# Patient Record
Sex: MALE | Race: WHITE | NOT HISPANIC OR LATINO | Employment: OTHER | URBAN - NONMETROPOLITAN AREA
[De-identification: names, ages, dates, MRNs, and addresses within clinical notes are randomized per-mention and may not be internally consistent; named-entity substitution may affect disease eponyms.]

---

## 2021-11-04 ENCOUNTER — HOSPITAL ENCOUNTER (INPATIENT)
Facility: HOSPITAL | Age: 41
LOS: 2 days | Discharge: LEFT BEFORE DISCHARGE | End: 2021-11-06
Attending: PSYCHIATRY & NEUROLOGY | Admitting: PSYCHIATRY & NEUROLOGY

## 2021-11-04 ENCOUNTER — HOSPITAL ENCOUNTER (EMERGENCY)
Facility: HOSPITAL | Age: 41
Discharge: HOME | End: 2021-11-04
Attending: EMERGENCY MEDICINE | Admitting: EMERGENCY MEDICINE

## 2021-11-04 ENCOUNTER — HOSPITAL ENCOUNTER (EMERGENCY)
Facility: HOSPITAL | Age: 41
Discharge: PSYCHIATRIC OR SUBSTANCE USE FACILITY | End: 2021-11-04
Attending: EMERGENCY MEDICINE | Admitting: EMERGENCY MEDICINE

## 2021-11-04 VITALS
WEIGHT: 163 LBS | DIASTOLIC BLOOD PRESSURE: 80 MMHG | OXYGEN SATURATION: 99 % | HEART RATE: 85 BPM | SYSTOLIC BLOOD PRESSURE: 125 MMHG | RESPIRATION RATE: 18 BRPM | BODY MASS INDEX: 22.08 KG/M2 | HEIGHT: 72 IN | TEMPERATURE: 97.8 F

## 2021-11-04 VITALS
WEIGHT: 182 LBS | DIASTOLIC BLOOD PRESSURE: 71 MMHG | BODY MASS INDEX: 24.65 KG/M2 | SYSTOLIC BLOOD PRESSURE: 123 MMHG | HEART RATE: 102 BPM | HEIGHT: 72 IN | OXYGEN SATURATION: 98 % | TEMPERATURE: 97.3 F | RESPIRATION RATE: 18 BRPM

## 2021-11-04 DIAGNOSIS — F19.10 SUBSTANCE ABUSE (HCC): Primary | ICD-10-CM

## 2021-11-04 DIAGNOSIS — R68.89 SYMPTOM OF DRUG WITHDRAWAL: Primary | ICD-10-CM

## 2021-11-04 PROBLEM — F19.20 CHEMICAL DEPENDENCY (HCC): Status: ACTIVE | Noted: 2021-11-04

## 2021-11-04 LAB
ALBUMIN SERPL-MCNC: 4.19 G/DL (ref 3.5–5.2)
ALBUMIN SERPL-MCNC: 4.23 G/DL (ref 3.5–5.2)
ALBUMIN/GLOB SERPL: 1.8 G/DL
ALBUMIN/GLOB SERPL: 1.8 G/DL
ALP SERPL-CCNC: 87 U/L (ref 39–117)
ALP SERPL-CCNC: 88 U/L (ref 39–117)
ALT SERPL W P-5'-P-CCNC: 15 U/L (ref 1–41)
ALT SERPL W P-5'-P-CCNC: 15 U/L (ref 1–41)
AMPHET+METHAMPHET UR QL: NEGATIVE
AMPHET+METHAMPHET UR QL: NEGATIVE
AMPHETAMINES UR QL: NEGATIVE
AMPHETAMINES UR QL: NEGATIVE
ANION GAP SERPL CALCULATED.3IONS-SCNC: 10.2 MMOL/L (ref 5–15)
ANION GAP SERPL CALCULATED.3IONS-SCNC: 10.5 MMOL/L (ref 5–15)
APAP SERPL-MCNC: <5 MCG/ML (ref 0–30)
AST SERPL-CCNC: 13 U/L (ref 1–40)
AST SERPL-CCNC: 14 U/L (ref 1–40)
BARBITURATES UR QL SCN: NEGATIVE
BARBITURATES UR QL SCN: NEGATIVE
BASOPHILS # BLD AUTO: 0.07 10*3/MM3 (ref 0–0.2)
BASOPHILS # BLD AUTO: 0.07 10*3/MM3 (ref 0–0.2)
BASOPHILS NFR BLD AUTO: 0.6 % (ref 0–1.5)
BASOPHILS NFR BLD AUTO: 0.6 % (ref 0–1.5)
BENZODIAZ UR QL SCN: NEGATIVE
BENZODIAZ UR QL SCN: POSITIVE
BILIRUB SERPL-MCNC: 0.3 MG/DL (ref 0–1.2)
BILIRUB SERPL-MCNC: 0.3 MG/DL (ref 0–1.2)
BILIRUB UR QL STRIP: NEGATIVE
BUN SERPL-MCNC: 10 MG/DL (ref 6–20)
BUN SERPL-MCNC: 11 MG/DL (ref 6–20)
BUN/CREAT SERPL: 11.4 (ref 7–25)
BUN/CREAT SERPL: 13.1 (ref 7–25)
BUPRENORPHINE SERPL-MCNC: NEGATIVE NG/ML
BUPRENORPHINE SERPL-MCNC: NEGATIVE NG/ML
CALCIUM SPEC-SCNC: 9.2 MG/DL (ref 8.6–10.5)
CALCIUM SPEC-SCNC: 9.3 MG/DL (ref 8.6–10.5)
CANNABINOIDS SERPL QL: POSITIVE
CANNABINOIDS SERPL QL: POSITIVE
CHLORIDE SERPL-SCNC: 107 MMOL/L (ref 98–107)
CHLORIDE SERPL-SCNC: 108 MMOL/L (ref 98–107)
CLARITY UR: ABNORMAL
CO2 SERPL-SCNC: 24.5 MMOL/L (ref 22–29)
CO2 SERPL-SCNC: 25.8 MMOL/L (ref 22–29)
COCAINE UR QL: NEGATIVE
COCAINE UR QL: NEGATIVE
COLOR UR: YELLOW
CREAT SERPL-MCNC: 0.84 MG/DL (ref 0.76–1.27)
CREAT SERPL-MCNC: 0.88 MG/DL (ref 0.76–1.27)
DEPRECATED RDW RBC AUTO: 46 FL (ref 37–54)
DEPRECATED RDW RBC AUTO: 48.6 FL (ref 37–54)
EOSINOPHIL # BLD AUTO: 0.36 10*3/MM3 (ref 0–0.4)
EOSINOPHIL # BLD AUTO: 0.39 10*3/MM3 (ref 0–0.4)
EOSINOPHIL NFR BLD AUTO: 3.1 % (ref 0.3–6.2)
EOSINOPHIL NFR BLD AUTO: 3.2 % (ref 0.3–6.2)
ERYTHROCYTE [DISTWIDTH] IN BLOOD BY AUTOMATED COUNT: 13.8 % (ref 12.3–15.4)
ERYTHROCYTE [DISTWIDTH] IN BLOOD BY AUTOMATED COUNT: 14.3 % (ref 12.3–15.4)
ETHANOL BLD-MCNC: <10 MG/DL (ref 0–10)
ETHANOL BLD-MCNC: <10 MG/DL (ref 0–10)
ETHANOL UR QL: <0.01 %
ETHANOL UR QL: <0.01 %
FLUAV RNA RESP QL NAA+PROBE: NOT DETECTED
FLUAV RNA RESP QL NAA+PROBE: NOT DETECTED
FLUBV RNA RESP QL NAA+PROBE: NOT DETECTED
FLUBV RNA RESP QL NAA+PROBE: NOT DETECTED
GFR SERPL CREATININE-BSD FRML MDRD: 102 ML/MIN/1.73
GFR SERPL CREATININE-BSD FRML MDRD: 96 ML/MIN/1.73
GLOBULIN UR ELPH-MCNC: 2.3 GM/DL
GLOBULIN UR ELPH-MCNC: 2.4 GM/DL
GLUCOSE SERPL-MCNC: 127 MG/DL (ref 65–99)
GLUCOSE SERPL-MCNC: 134 MG/DL (ref 65–99)
GLUCOSE UR STRIP-MCNC: NEGATIVE MG/DL
HCT VFR BLD AUTO: 40.9 % (ref 37.5–51)
HCT VFR BLD AUTO: 42.9 % (ref 37.5–51)
HGB BLD-MCNC: 13.5 G/DL (ref 13–17.7)
HGB BLD-MCNC: 13.9 G/DL (ref 13–17.7)
HGB UR QL STRIP.AUTO: NEGATIVE
IMM GRANULOCYTES # BLD AUTO: 0.04 10*3/MM3 (ref 0–0.05)
IMM GRANULOCYTES # BLD AUTO: 0.05 10*3/MM3 (ref 0–0.05)
IMM GRANULOCYTES NFR BLD AUTO: 0.3 % (ref 0–0.5)
IMM GRANULOCYTES NFR BLD AUTO: 0.4 % (ref 0–0.5)
KETONES UR QL STRIP: NEGATIVE
LEUKOCYTE ESTERASE UR QL STRIP.AUTO: NEGATIVE
LYMPHOCYTES # BLD AUTO: 2.86 10*3/MM3 (ref 0.7–3.1)
LYMPHOCYTES # BLD AUTO: 3.58 10*3/MM3 (ref 0.7–3.1)
LYMPHOCYTES NFR BLD AUTO: 24.3 % (ref 19.6–45.3)
LYMPHOCYTES NFR BLD AUTO: 29 % (ref 19.6–45.3)
MAGNESIUM SERPL-MCNC: 1.9 MG/DL (ref 1.6–2.6)
MCH RBC QN AUTO: 29.7 PG (ref 26.6–33)
MCH RBC QN AUTO: 29.9 PG (ref 26.6–33)
MCHC RBC AUTO-ENTMCNC: 32.4 G/DL (ref 31.5–35.7)
MCHC RBC AUTO-ENTMCNC: 33 G/DL (ref 31.5–35.7)
MCV RBC AUTO: 90.5 FL (ref 79–97)
MCV RBC AUTO: 91.7 FL (ref 79–97)
METHADONE UR QL SCN: NEGATIVE
METHADONE UR QL SCN: NEGATIVE
MONOCYTES # BLD AUTO: 0.72 10*3/MM3 (ref 0.1–0.9)
MONOCYTES # BLD AUTO: 0.8 10*3/MM3 (ref 0.1–0.9)
MONOCYTES NFR BLD AUTO: 6.1 % (ref 5–12)
MONOCYTES NFR BLD AUTO: 6.5 % (ref 5–12)
NEUTROPHILS NFR BLD AUTO: 60.3 % (ref 42.7–76)
NEUTROPHILS NFR BLD AUTO: 65.6 % (ref 42.7–76)
NEUTROPHILS NFR BLD AUTO: 7.44 10*3/MM3 (ref 1.7–7)
NEUTROPHILS NFR BLD AUTO: 7.74 10*3/MM3 (ref 1.7–7)
NITRITE UR QL STRIP: NEGATIVE
NRBC BLD AUTO-RTO: 0 /100 WBC (ref 0–0.2)
NRBC BLD AUTO-RTO: 0 /100 WBC (ref 0–0.2)
OPIATES UR QL: NEGATIVE
OPIATES UR QL: NEGATIVE
OXYCODONE UR QL SCN: NEGATIVE
OXYCODONE UR QL SCN: NEGATIVE
PCP UR QL SCN: NEGATIVE
PCP UR QL SCN: NEGATIVE
PH UR STRIP.AUTO: 7.5 [PH] (ref 5–8)
PLATELET # BLD AUTO: 291 10*3/MM3 (ref 140–450)
PLATELET # BLD AUTO: 318 10*3/MM3 (ref 140–450)
PMV BLD AUTO: 9.1 FL (ref 6–12)
PMV BLD AUTO: 9.2 FL (ref 6–12)
POTASSIUM SERPL-SCNC: 3.9 MMOL/L (ref 3.5–5.2)
POTASSIUM SERPL-SCNC: 4.3 MMOL/L (ref 3.5–5.2)
PROPOXYPH UR QL: NEGATIVE
PROPOXYPH UR QL: NEGATIVE
PROT SERPL-MCNC: 6.5 G/DL (ref 6–8.5)
PROT SERPL-MCNC: 6.6 G/DL (ref 6–8.5)
PROT UR QL STRIP: NEGATIVE
RBC # BLD AUTO: 4.52 10*6/MM3 (ref 4.14–5.8)
RBC # BLD AUTO: 4.68 10*6/MM3 (ref 4.14–5.8)
SALICYLATES SERPL-MCNC: <0.3 MG/DL
SARS-COV-2 RNA RESP QL NAA+PROBE: NOT DETECTED
SARS-COV-2 RNA RESP QL NAA+PROBE: NOT DETECTED
SODIUM SERPL-SCNC: 143 MMOL/L (ref 136–145)
SODIUM SERPL-SCNC: 143 MMOL/L (ref 136–145)
SP GR UR STRIP: 1.02 (ref 1–1.03)
TRICYCLICS UR QL SCN: POSITIVE
TRICYCLICS UR QL SCN: POSITIVE
UROBILINOGEN UR QL STRIP: ABNORMAL
WBC # BLD AUTO: 11.79 10*3/MM3 (ref 3.4–10.8)
WBC # BLD AUTO: 12.33 10*3/MM3 (ref 3.4–10.8)

## 2021-11-04 PROCEDURE — C9803 HOPD COVID-19 SPEC COLLECT: HCPCS

## 2021-11-04 PROCEDURE — 81003 URINALYSIS AUTO W/O SCOPE: CPT | Performed by: EMERGENCY MEDICINE

## 2021-11-04 PROCEDURE — 80306 DRUG TEST PRSMV INSTRMNT: CPT | Performed by: EMERGENCY MEDICINE

## 2021-11-04 PROCEDURE — 85025 COMPLETE CBC W/AUTO DIFF WBC: CPT | Performed by: EMERGENCY MEDICINE

## 2021-11-04 PROCEDURE — 80143 DRUG ASSAY ACETAMINOPHEN: CPT | Performed by: EMERGENCY MEDICINE

## 2021-11-04 PROCEDURE — 87636 SARSCOV2 & INF A&B AMP PRB: CPT | Performed by: EMERGENCY MEDICINE

## 2021-11-04 PROCEDURE — 93005 ELECTROCARDIOGRAM TRACING: CPT | Performed by: PSYCHIATRY & NEUROLOGY

## 2021-11-04 PROCEDURE — HZ2ZZZZ DETOXIFICATION SERVICES FOR SUBSTANCE ABUSE TREATMENT: ICD-10-PCS | Performed by: PSYCHIATRY & NEUROLOGY

## 2021-11-04 PROCEDURE — 82077 ASSAY SPEC XCP UR&BREATH IA: CPT | Performed by: EMERGENCY MEDICINE

## 2021-11-04 PROCEDURE — 99284 EMERGENCY DEPT VISIT MOD MDM: CPT

## 2021-11-04 PROCEDURE — 80179 DRUG ASSAY SALICYLATE: CPT | Performed by: EMERGENCY MEDICINE

## 2021-11-04 PROCEDURE — 83735 ASSAY OF MAGNESIUM: CPT | Performed by: EMERGENCY MEDICINE

## 2021-11-04 PROCEDURE — 63710000001 ONDANSETRON ODT 4 MG TABLET DISPERSIBLE: Performed by: EMERGENCY MEDICINE

## 2021-11-04 PROCEDURE — 80053 COMPREHEN METABOLIC PANEL: CPT | Performed by: EMERGENCY MEDICINE

## 2021-11-04 RX ORDER — LORAZEPAM 2 MG/1
2 TABLET ORAL EVERY 4 HOURS PRN
Status: DISCONTINUED | OUTPATIENT
Start: 2021-11-05 | End: 2021-11-05

## 2021-11-04 RX ORDER — CLONIDINE HYDROCHLORIDE 0.1 MG/1
0.1 TABLET ORAL 4 TIMES DAILY PRN
Status: DISPENSED | OUTPATIENT
Start: 2021-11-05 | End: 2021-11-06

## 2021-11-04 RX ORDER — FAMOTIDINE 20 MG/1
20 TABLET, FILM COATED ORAL 2 TIMES DAILY PRN
Status: DISCONTINUED | OUTPATIENT
Start: 2021-11-04 | End: 2021-11-06 | Stop reason: HOSPADM

## 2021-11-04 RX ORDER — BENZONATATE 100 MG/1
100 CAPSULE ORAL 3 TIMES DAILY PRN
Status: DISCONTINUED | OUTPATIENT
Start: 2021-11-04 | End: 2021-11-06 | Stop reason: HOSPADM

## 2021-11-04 RX ORDER — HYDROXYZINE 50 MG/1
50 TABLET, FILM COATED ORAL EVERY 6 HOURS PRN
Status: DISCONTINUED | OUTPATIENT
Start: 2021-11-04 | End: 2021-11-06 | Stop reason: HOSPADM

## 2021-11-04 RX ORDER — NICOTINE 21 MG/24HR
1 PATCH, TRANSDERMAL 24 HOURS TRANSDERMAL
Status: DISCONTINUED | OUTPATIENT
Start: 2021-11-05 | End: 2021-11-06 | Stop reason: HOSPADM

## 2021-11-04 RX ORDER — ONDANSETRON 4 MG/1
4 TABLET, FILM COATED ORAL EVERY 6 HOURS PRN
Status: DISCONTINUED | OUTPATIENT
Start: 2021-11-04 | End: 2021-11-06 | Stop reason: HOSPADM

## 2021-11-04 RX ORDER — ONDANSETRON 4 MG/1
4 TABLET, ORALLY DISINTEGRATING ORAL EVERY 6 HOURS PRN
Qty: 20 TABLET | Refills: 0 | Status: SHIPPED | OUTPATIENT
Start: 2021-11-04 | End: 2021-11-04

## 2021-11-04 RX ORDER — ONDANSETRON 4 MG/1
4 TABLET, ORALLY DISINTEGRATING ORAL ONCE
Status: COMPLETED | OUTPATIENT
Start: 2021-11-04 | End: 2021-11-04

## 2021-11-04 RX ORDER — LORAZEPAM 0.5 MG/1
0.5 TABLET ORAL
Status: DISCONTINUED | OUTPATIENT
Start: 2021-11-08 | End: 2021-11-05

## 2021-11-04 RX ORDER — LORAZEPAM 2 MG/1
2 TABLET ORAL
Status: DISCONTINUED | OUTPATIENT
Start: 2021-11-05 | End: 2021-11-05

## 2021-11-04 RX ORDER — CYCLOBENZAPRINE HCL 10 MG
10 TABLET ORAL 3 TIMES DAILY PRN
Status: DISCONTINUED | OUTPATIENT
Start: 2021-11-04 | End: 2021-11-06 | Stop reason: HOSPADM

## 2021-11-04 RX ORDER — CLONIDINE HYDROCHLORIDE 0.1 MG/1
0.1 TABLET ORAL 4 TIMES DAILY PRN
Status: DISPENSED | OUTPATIENT
Start: 2021-11-04 | End: 2021-11-05

## 2021-11-04 RX ORDER — BENZTROPINE MESYLATE 1 MG/ML
1 INJECTION INTRAMUSCULAR; INTRAVENOUS ONCE AS NEEDED
Status: DISCONTINUED | OUTPATIENT
Start: 2021-11-04 | End: 2021-11-06 | Stop reason: HOSPADM

## 2021-11-04 RX ORDER — IBUPROFEN 400 MG/1
400 TABLET ORAL EVERY 6 HOURS PRN
Status: DISCONTINUED | OUTPATIENT
Start: 2021-11-04 | End: 2021-11-06 | Stop reason: HOSPADM

## 2021-11-04 RX ORDER — TRAZODONE HYDROCHLORIDE 50 MG/1
50 TABLET ORAL NIGHTLY PRN
Status: DISCONTINUED | OUTPATIENT
Start: 2021-11-04 | End: 2021-11-06 | Stop reason: HOSPADM

## 2021-11-04 RX ORDER — ALUMINA, MAGNESIA, AND SIMETHICONE 2400; 2400; 240 MG/30ML; MG/30ML; MG/30ML
15 SUSPENSION ORAL EVERY 6 HOURS PRN
Status: DISCONTINUED | OUTPATIENT
Start: 2021-11-04 | End: 2021-11-06 | Stop reason: HOSPADM

## 2021-11-04 RX ORDER — LORAZEPAM 1 MG/1
1 TABLET ORAL
Status: DISCONTINUED | OUTPATIENT
Start: 2021-11-07 | End: 2021-11-05

## 2021-11-04 RX ORDER — CLONIDINE HYDROCHLORIDE 0.1 MG/1
0.1 TABLET ORAL 2 TIMES DAILY PRN
Status: DISCONTINUED | OUTPATIENT
Start: 2021-11-07 | End: 2021-11-06 | Stop reason: HOSPADM

## 2021-11-04 RX ORDER — LORAZEPAM 1 MG/1
1 TABLET ORAL EVERY 4 HOURS PRN
Status: DISCONTINUED | OUTPATIENT
Start: 2021-11-07 | End: 2021-11-05

## 2021-11-04 RX ORDER — CLONIDINE HYDROCHLORIDE 0.1 MG/1
0.1 TABLET ORAL 3 TIMES DAILY PRN
Status: DISCONTINUED | OUTPATIENT
Start: 2021-11-06 | End: 2021-11-06 | Stop reason: HOSPADM

## 2021-11-04 RX ORDER — CLONIDINE HYDROCHLORIDE 0.1 MG/1
0.1 TABLET ORAL DAILY PRN
Status: DISCONTINUED | OUTPATIENT
Start: 2021-11-08 | End: 2021-11-06 | Stop reason: HOSPADM

## 2021-11-04 RX ORDER — LORAZEPAM 1 MG/1
1 TABLET ORAL ONCE
Status: COMPLETED | OUTPATIENT
Start: 2021-11-04 | End: 2021-11-04

## 2021-11-04 RX ORDER — BENZTROPINE MESYLATE 1 MG/1
2 TABLET ORAL ONCE AS NEEDED
Status: DISCONTINUED | OUTPATIENT
Start: 2021-11-04 | End: 2021-11-06 | Stop reason: HOSPADM

## 2021-11-04 RX ORDER — DICYCLOMINE HYDROCHLORIDE 10 MG/1
10 CAPSULE ORAL 3 TIMES DAILY PRN
Status: DISCONTINUED | OUTPATIENT
Start: 2021-11-04 | End: 2021-11-06 | Stop reason: HOSPADM

## 2021-11-04 RX ORDER — ECHINACEA PURPUREA EXTRACT 125 MG
2 TABLET ORAL AS NEEDED
Status: DISCONTINUED | OUTPATIENT
Start: 2021-11-04 | End: 2021-11-06 | Stop reason: HOSPADM

## 2021-11-04 RX ORDER — LORAZEPAM 2 MG/1
2 TABLET ORAL
Status: DISCONTINUED | OUTPATIENT
Start: 2021-11-04 | End: 2021-11-05

## 2021-11-04 RX ORDER — LOPERAMIDE HYDROCHLORIDE 2 MG/1
2 CAPSULE ORAL
Status: DISCONTINUED | OUTPATIENT
Start: 2021-11-04 | End: 2021-11-06 | Stop reason: HOSPADM

## 2021-11-04 RX ORDER — ACETAMINOPHEN 325 MG/1
650 TABLET ORAL EVERY 6 HOURS PRN
Status: DISCONTINUED | OUTPATIENT
Start: 2021-11-04 | End: 2021-11-06 | Stop reason: HOSPADM

## 2021-11-04 RX ORDER — LORAZEPAM 0.5 MG/1
0.5 TABLET ORAL EVERY 4 HOURS PRN
Status: DISCONTINUED | OUTPATIENT
Start: 2021-11-08 | End: 2021-11-05

## 2021-11-04 RX ADMIN — ONDANSETRON 4 MG: 4 TABLET, ORALLY DISINTEGRATING ORAL at 02:47

## 2021-11-04 RX ADMIN — LORAZEPAM 1 MG: 1 TABLET ORAL at 02:47

## 2021-11-04 NOTE — NURSING NOTE
Pt declining assessment, states he was at [facility] and called his mom to come get him to bring him here for detox. Pt now states he wishes to go back to [facility] so he doesn't mess up his recovery. Pt does not want any controlled substances to help with his withdrawals. No SI/HI/AVH reported.

## 2021-11-04 NOTE — ED PROVIDER NOTES
Subjective   41-year-old male presents to the ER with complaints of withdrawal symptoms. Patient states that he is used Suboxone and methamphetamines and opiates over the last 20 years. Patient is currently a resident at a local rehab facility when he started experiencing the symptoms. Patient states he has had nausea and vomiting. Patient verbalizes last use of recreational Suboxone was about 1 week prior to arrival. Patient denies any SI or HI.      History provided by:  Patient      Review of Systems   Constitutional: Negative.  Negative for fever.   HENT: Negative.    Respiratory: Negative.    Cardiovascular: Negative.  Negative for chest pain.   Gastrointestinal: Positive for nausea and vomiting. Negative for abdominal pain.   Endocrine: Negative.    Genitourinary: Negative.  Negative for dysuria.   Skin: Negative.    Neurological: Negative.    Psychiatric/Behavioral: Negative.    All other systems reviewed and are negative.      No past medical history on file.    No Known Allergies    No past surgical history on file.    No family history on file.    Social History     Socioeconomic History   • Marital status:            Objective   Physical Exam  Vitals and nursing note reviewed.   Constitutional:       General: He is not in acute distress.     Appearance: He is well-developed. He is not diaphoretic.   HENT:      Head: Normocephalic and atraumatic.      Right Ear: External ear normal.      Left Ear: External ear normal.      Nose: Nose normal.   Eyes:      Conjunctiva/sclera: Conjunctivae normal.   Neck:      Vascular: No JVD.      Trachea: No tracheal deviation.   Cardiovascular:      Rate and Rhythm: Normal rate.      Heart sounds: No murmur heard.      Pulmonary:      Effort: Pulmonary effort is normal. No respiratory distress.      Breath sounds: No wheezing.   Abdominal:      Palpations: Abdomen is soft.      Tenderness: There is no abdominal tenderness.   Musculoskeletal:         General: No  deformity. Normal range of motion.      Cervical back: Normal range of motion and neck supple.   Skin:     General: Skin is warm and dry.      Coloration: Skin is not pale.      Findings: No erythema or rash.   Neurological:      Mental Status: He is alert and oriented to person, place, and time.      Cranial Nerves: No cranial nerve deficit.   Psychiatric:         Behavior: Behavior normal.         Thought Content: Thought content normal.         Procedures           ED Course  ED Course as of 11/04/21 0439   Thu Nov 04, 2021 0438 Patient verbalized that he is feeling better. Patient wishes to go back to rehab facility. Patient was cleared by behavioral health to be discharged. [RB]      ED Course User Index  [RB] Agustin Kidd II, PA                                           MDM  Number of Diagnoses or Management Options  Symptom of drug withdrawal: new and requires workup     Amount and/or Complexity of Data Reviewed  Clinical lab tests: ordered and reviewed  Discuss the patient with other providers: yes    Risk of Complications, Morbidity, and/or Mortality  Presenting problems: low  Diagnostic procedures: low  Management options: low    Patient Progress  Patient progress: stable      Final diagnoses:   Symptom of drug withdrawal       ED Disposition  ED Disposition     ED Disposition Condition Comment    Discharge Stable           Marie Ocampo, APRN  [address] [address] [address]  [phone]    Schedule an appointment as soon as possible for a visit            Medication List      New Prescriptions    ondansetron ODT 4 MG disintegrating tablet  Commonly known as: ZOFRAN-ODT  Place 1 tablet on the tongue Every 6 (Six) Hours As Needed for Nausea or Vomiting.           Where to Get Your Medications      You can get these medications from any pharmacy    Bring a paper prescription for each of these medications  · ondansetron ODT 4 MG disintegrating tablet          Agustin Kidd II, PA  11/04/21 0439

## 2021-11-04 NOTE — NURSING NOTE
Pt to intake. Search completed with 2 staff members present. Pt educated about mask and encouraged to keep mask on in intake area. Items placed in cabinet.

## 2021-11-04 NOTE — NURSING NOTE
Spoke with Dr. Ruiz presenting pt clinicals.  MD advised pt does not wish to be admitted. Careful discussion with patient about discharge. No objective or reported signs of SI, acute distress, or self harm. Safety plan discussed, patient contracted. Offered inpatient services if felt needed. Patient declined. Crisis number provided. Advised if change of condition or worsening of symptoms return to ed and/or seek outpt services.

## 2021-11-05 PROBLEM — F15.20 METHAMPHETAMINE USE DISORDER, SEVERE (HCC): Status: ACTIVE | Noted: 2021-11-05

## 2021-11-05 PROBLEM — F17.200 NICOTINE USE DISORDER: Status: ACTIVE | Noted: 2021-11-05

## 2021-11-05 PROBLEM — F12.20 TETRAHYDROCANNABINOL (THC) USE DISORDER, SEVERE, DEPENDENCE (HCC): Status: ACTIVE | Noted: 2021-11-05

## 2021-11-05 PROBLEM — F11.20 OPIOID USE DISORDER, SEVERE, DEPENDENCE (HCC): Status: ACTIVE | Noted: 2021-11-04

## 2021-11-05 PROBLEM — F32.A DEPRESSION: Status: ACTIVE | Noted: 2021-11-05

## 2021-11-05 LAB
QT INTERVAL: 388 MS
QTC INTERVAL: 406 MS

## 2021-11-05 PROCEDURE — 93010 ELECTROCARDIOGRAM REPORT: CPT | Performed by: INTERNAL MEDICINE

## 2021-11-05 PROCEDURE — 99223 1ST HOSP IP/OBS HIGH 75: CPT | Performed by: PSYCHIATRY & NEUROLOGY

## 2021-11-05 RX ORDER — CHLORDIAZEPOXIDE HYDROCHLORIDE 25 MG/1
25 CAPSULE, GELATIN COATED ORAL ONCE
Status: DISCONTINUED | OUTPATIENT
Start: 2021-11-05 | End: 2021-11-05

## 2021-11-05 RX ORDER — FLUOXETINE HYDROCHLORIDE 20 MG/1
20 CAPSULE ORAL DAILY
Status: DISCONTINUED | OUTPATIENT
Start: 2021-11-05 | End: 2021-11-06 | Stop reason: HOSPADM

## 2021-11-05 RX ADMIN — HYDROXYZINE HYDROCHLORIDE 50 MG: 50 TABLET ORAL at 21:56

## 2021-11-05 RX ADMIN — CLONIDINE HYDROCHLORIDE 0.1 MG: 0.1 TABLET ORAL at 22:20

## 2021-11-05 RX ADMIN — TRAZODONE HYDROCHLORIDE 50 MG: 50 TABLET ORAL at 21:21

## 2021-11-05 RX ADMIN — CLONIDINE HYDROCHLORIDE 0.1 MG: 0.1 TABLET ORAL at 21:56

## 2021-11-05 RX ADMIN — CLONIDINE HYDROCHLORIDE 0.1 MG: 0.1 TABLET ORAL at 14:22

## 2021-11-05 RX ADMIN — LORAZEPAM 2 MG: 2 TABLET ORAL at 08:39

## 2021-11-05 RX ADMIN — FLUOXETINE HYDROCHLORIDE 20 MG: 20 CAPSULE ORAL at 14:21

## 2021-11-05 RX ADMIN — IBUPROFEN 400 MG: 400 TABLET, FILM COATED ORAL at 08:40

## 2021-11-05 RX ADMIN — CYCLOBENZAPRINE 10 MG: 10 TABLET, FILM COATED ORAL at 21:21

## 2021-11-05 NOTE — H&P
INITIAL PSYCHIATRIC HISTORY & PHYSICAL    Patient Identification:  Name:  Kvng Adler  Age:  41 y.o.  Sex:  male  :  1980  MRN:  [id number]   Visit Number:  [id number]  Primary Care Physician:  Marie Ocampo APRN    SUBJECTIVE    CC/Focus of Exam: withdrawals from Suboxone    HPI: Kvng Adler is a 41 y.o. male who was admitted on 2021 with complaints of opioid use and withdrawals. The patient reports a long history of substance use. First use was at age 20 when he used Xanax and Lorcet recreationally. Over time the use increased and the patient  continued to use despite negative consequences. The patient endorses symptoms of tolerance and withdrawals. Has tried to cut down and stop but has not been successful. Spends too much time and resources in pursuit of substance use. Longest period of sobriety is reported to be 3 months.  Currently using Suboxone 8-2 mg one a day intranasally last use was a week ago, meth half gram daily via smoking last use two weeks ago, marijuana a quarter of ounce daily last use a week ago, oxycodone up to a 100 mg daily intranasally last use three weeks. He states he was at the recovery house and was put in treatment under [place] and plans to go back to treatment. He denies regular benzo use, stopped using regularly when he was 21 or 22. He states he used a Xanax one time yesterday, and was also given Ativan one time when he was in the ED.   Withdrawal symptoms include twitching, chills, body aches.     PAST PSYCHIATRIC HX: Patient reports he was recently started on Prozac for depression and anxiety by his pcp.     SUBSTANCE USE HX: See HPI. Smokes cigarettes about one ppd.     SOCIAL HX:   Social History     Socioeconomic History   • Marital status:    Tobacco Use   • Smoking status: Current Every Day Smoker     Packs/day: 1.00     Types: Cigarettes   • Smokeless tobacco: Never Used   Vaping Use   • Vaping Use: Never used   Substance and Sexual  Activity   • Alcohol use: Not Currently   • Drug use: Yes     Types: Benzodiazepines, Other, Oxycodone, Methamphetamines, Marijuana     Comment: Suboxone   • Sexual activity: Yes     Partners: Female         Past Medical History:   Diagnosis Date   • Substance abuse (HCC)         History reviewed. No pertinent surgical history.    Family History   Problem Relation Age of Onset   • Bipolar disorder Mother          No medications prior to admission.         ALLERGIES:  Patient has no known allergies.    Temp:  [97.5 °F (36.4 °C)-98.6 °F (37 °C)] 97.8 °F (36.6 °C)  Heart Rate:  [] 85  Resp:  [10-18] 18  BP: (116-152)/(80-97) 152/86    REVIEW OF SYSTEMS:  Review of Systems   Constitutional: Positive for chills.   HENT: Negative.    Eyes: Negative.    Respiratory: Negative.    Cardiovascular: Negative.    Gastrointestinal: Negative.    Endocrine: Negative.    Genitourinary: Negative.    Musculoskeletal: Negative.    Skin: Negative.    Allergic/Immunologic: Negative.    Neurological: Negative.    Hematological: Negative.    Psychiatric/Behavioral: The patient is nervous/anxious.         OBJECTIVE    PHYSICAL EXAM:  Physical Exam  Constitutional:  Appears well-developed and well-nourished.   HENT:   Head: Normocephalic and atraumatic.   Right Ear: External ear normal.   Left Ear: External ear normal.   Mouth/Throat: Oropharynx is clear and moist.   Eyes: Pupils are equal, round, and reactive to light. Conjunctivae and EOM are normal.   Neck: Normal range of motion. Neck supple.   Cardiovascular: Normal rate, regular rhythm and normal heart sounds.    Respiratory: Effort normal and breath sounds normal. No respiratory distress. No wheezes.   GI: Soft. Bowel sounds are normal.No distension. There is no tenderness.   Musculoskeletal: Normal range of motion. No edema or deformity.   Neurological:No cranial nerve deficit. Coordination normal.   Skin: Skin is warm and dry. No rash noted. No erythema.       MENTAL STATUS  EXAM:   Hygiene:   fair  Cooperation:  Cooperative  Eye Contact:  Fair  Psychomotor Behavior:  Appropriate  Affect:  Appropriate  Hopelessness: Denies  Speech:  Normal  Goal directed  Thought Content:  Normal  Suicidal:  None  Homicidal:  None  Hallucinations:  None  Delusion:  None  Memory:  Intact  Orientation:  Person, Place, Time and Situation  Reliability:  fair  Insight:  Fair  Judgement:  Fair      Imaging Results (Last 24 Hours)     ** No results found for the last 24 hours. **           ECG/EMG Results (most recent)     Procedure Component Value Units Date/Time    ECG 12 Lead [[id number]] Collected: 11/05/21 0044     Updated: 11/05/21 1240     QT Interval 388 ms      QTC Interval 406 ms     Narrative:      Test Reason : Baseline Cardiac Status  Blood Pressure :   */*   mmHG  Vent. Rate :  66 BPM     Atrial Rate :  66 BPM     P-R Int : 140 ms          QRS Dur :  94 ms      QT Int : 388 ms       P-R-T Axes :  57  31  61 degrees     QTc Int : 406 ms    Normal sinus rhythm with sinus arrhythmia  Possible Anterior infarct , age undetermined  Abnormal ECG  No previous ECGs available  Confirmed by Bryant Parsons ([id number]) on 11/5/2021 12:37:41 PM    Referred By:            Confirmed By: Bryant Parsons           Lab Results   Component Value Date    GLUCOSE 127 (H) 11/04/2021    BUN 10 11/04/2021    CREATININE 0.88 11/04/2021    EGFRIFNONA 96 11/04/2021    BCR 11.4 11/04/2021    CO2 24.5 11/04/2021    CALCIUM 9.3 11/04/2021    ALBUMIN 4.19 11/04/2021    AST 14 11/04/2021    ALT 15 11/04/2021       Lab Results   Component Value Date    WBC 11.79 (H) 11/04/2021    HGB 13.9 11/04/2021    HCT 42.9 11/04/2021    MCV 91.7 11/04/2021     11/04/2021       Last Urine Toxicity     LAST URINE TOXICITY RESULTS Latest Ref Rng & Units 11/4/2021 11/4/2021    AMPHETAMINES SCREEN, URINE Negative Negative Negative    BARBITURATES SCREEN Negative Negative Negative    BENZODIAZEPINE SCREEN, URINE Negative Positive(A) Negative     BUPRENORPHINEUR Negative Negative Negative    COCAINE SCREEN, URINE Negative Negative Negative    METHADONE SCREEN, URINE Negative Negative Negative    METHAMPHETAMINEUR Negative Negative Negative          Brief Urine Lab Results  (Last result in the past 365 days)      Color   Clarity   Blood   Leuk Est   Nitrite   Protein   CREAT   Urine HCG        11/04/21 1913 Yellow   Turbid   Negative   Negative   Negative   Negative                 DATA  Labs reviewed. Glucose 127, Chloride 108, WBC 11.79. UDS positive for benzodiazepine, thc and tricyclic antidepressant  EKG reviewed. QTc 406 ms. DANGELO reviewed. No active controlled rx noted.   Record reviewed. No previous treatments noted in this hospital.     Strengths: Motivated for treatment    Weaknesses:Substance use and Poor coping skills    Code status:  Full  Discussed code status with patient.    ASSESSMENT & PLAN:        Opioid use disorder, severe, dependence (HCC)  - Clonidine detox  - The patient may be experiencing delayed withdrawals from buprenorphine, will treat supportively and avoid giving more buprenorphine as he has not used any in more than a week.       Benzo use  - Patient denies regular use and only took one Xanax and was given Ativan in the ED. Doesn't appear to be in need of Ativan detox and it will be discontinued.      Methamphetamine use disorder, severe (HCC)  - Supportive treatment      Tetrahydrocannabinol (THC) use disorder, severe, dependence (HCC)  - Supportive treatment      Nicotine use disorder  - Encourage cessation      Depressive disorder unspecified  - Continue Prozac.      The patient has been admitted for safety and stabilization.  Patient will be monitored for suicidality daily and maintained on Special Precautions Level 4 (q30 min checks).  The patient will have individual and group therapy with a master's level therapist. A master treatment plan will be developed and agreed upon by the patient and his/her treatment team.   The patient's estimated length of stay in the hospital is 5-7 days.

## 2021-11-05 NOTE — PLAN OF CARE
Problem: Adult Behavioral Health Plan of Care  Goal: Plan of Care Review  Outcome: Ongoing, Progressing  Flowsheets (Taken 11/5/2021 1615)  Progress: no change  Plan of Care Reviewed With: patient  Outcome Summary: pt agrees to stay one more night.pt calm and cooperative.   Goal Outcome Evaluation:  Plan of Care Reviewed With: patient        Progress: no change  Outcome Summary: pt agrees to stay one more night.pt calm and cooperative.

## 2021-11-05 NOTE — ED PROVIDER NOTES
Subjective   41-year-old male presents to the ED today for detox evaluation. He states he needs detox from Suboxone. He states his last use was about a week ago. He states he is having tremors and shakes. He denies any alcohol use. He states he also uses methamphetamine, marijuana and oxycodone. He states that it has been over a week since he used these. He states he did have Ativan last night and a Xanax today to help with his withdrawal symptoms. He does not regularly use benzodiazepines. He denies any suicidal ideations.      History provided by:  Patient  Drug / Alcohol Assessment  Primary symptoms comment: requesting detox. This is a new problem. The current episode started more than 2 days ago. The problem has been gradually worsening. Suspected agents include opiates. Pertinent negatives include no nausea and no vomiting. Associated medical issues include addiction treatment.       Review of Systems   Constitutional: Negative.    HENT: Negative.    Eyes: Negative.    Respiratory: Negative.    Cardiovascular: Negative.    Gastrointestinal: Negative for nausea and vomiting.   Genitourinary: Negative.    Musculoskeletal: Negative.    Skin: Negative.    Neurological: Positive for tremors.   Psychiatric/Behavioral: Negative for suicidal ideas. The patient is nervous/anxious.    All other systems reviewed and are negative.      Past Medical History:   Diagnosis Date   • Substance abuse (HCC)        No Known Allergies    History reviewed. No pertinent surgical history.    Family History   Problem Relation Age of Onset   • Bipolar disorder Mother        Social History     Socioeconomic History   • Marital status:    Tobacco Use   • Smoking status: Current Every Day Smoker     Packs/day: 1.00     Types: Cigarettes   • Smokeless tobacco: Never Used   Vaping Use   • Vaping Use: Never used   Substance and Sexual Activity   • Alcohol use: Not Currently   • Drug use: Yes     Types: Benzodiazepines, Other, Oxycodone,  Methamphetamines, Marijuana     Comment: Suboxone   • Sexual activity: Yes     Partners: Female           Objective   Physical Exam  Vitals and nursing note reviewed.   Constitutional:       General: He is not in acute distress.     Appearance: Normal appearance.   HENT:      Head: Normocephalic and atraumatic.      Right Ear: External ear normal.      Left Ear: External ear normal.   Eyes:      Conjunctiva/sclera: Conjunctivae normal.      Pupils: Pupils are equal, round, and reactive to light.   Cardiovascular:      Rate and Rhythm: Regular rhythm. Tachycardia present.      Pulses: Normal pulses.      Heart sounds: Normal heart sounds.   Pulmonary:      Effort: Pulmonary effort is normal.      Breath sounds: Normal breath sounds.   Abdominal:      General: Bowel sounds are normal.      Palpations: Abdomen is soft.   Musculoskeletal:         General: Normal range of motion.      Cervical back: Normal range of motion and neck supple.   Skin:     General: Skin is warm and dry.      Capillary Refill: Capillary refill takes less than 2 seconds.   Neurological:      General: No focal deficit present.      Mental Status: He is alert and oriented to person, place, and time.   Psychiatric:         Mood and Affect: Mood is anxious.         Speech: Speech normal.         Behavior: Behavior normal. Behavior is cooperative.         Thought Content: Thought content does not include homicidal or suicidal ideation.         Procedures           ED Course  ED Course as of 11/04/21 2204   u Nov 04, 2021 2001 Medically clear for detox []   2015 Endorsed to Dr. Miller []      ED Course User Index  [] Wendy Rosenbaum, PA                                           [facility]  Number of Diagnoses or Management Options  Substance abuse (HCC): established and worsening     Amount and/or Complexity of Data Reviewed  Clinical lab tests: reviewed and ordered  Tests in the radiology section of CPT®: ordered and reviewed  Tests in the  medicine section of CPT®: ordered and reviewed  Review and summarize past medical records: yes  Discuss the patient with other providers: yes  Independent visualization of images, tracings, or specimens: yes    Risk of Complications, Morbidity, and/or Mortality  Presenting problems: moderate  Diagnostic procedures: moderate  Management options: moderate    Patient Progress  Patient progress: stable      Final diagnoses:   Substance abuse (HCC)       ED Disposition  ED Disposition     ED Disposition Condition Comment    Discharge to Behavioral Health Stable           No follow-up provider specified.       Medication List      No changes were made to your prescriptions during this visit.          Wendy Rosenbaum PA  11/04/21 2016       Cyril Miller MD  11/04/21 2206

## 2021-11-05 NOTE — NURSING NOTE
Intake assessment completed at this time. Pt presents to Intake wishing for detox. Pt was here at 2am, but did not want help detoxing and wished to go back to his rehab. Pt went home, spoke with his parents and wife, and agreed he needs help with detox. Pt states he took a Xanxax before coming to the ED to control his withdrawal symptoms.    Labs  WBC 11.79  UDS positive for benzos, THC, tricyclic antidepressants.    Anxiety 10 depression 5 craving 0 on scale of 0-10. Sleep poor appetite comes and goes.  Pt denies SI/HI/AVH.    COWS 9  CIWA 7    Pt A&Ox 3

## 2021-11-05 NOTE — NURSING NOTE
[facility] R[facility] contacted at this time for chart review and request of bed assignment, Bed assigned to 30B.

## 2021-11-05 NOTE — PLAN OF CARE
Goal Outcome Evaluation:  Plan of Care Reviewed With: patient  Patient Agreement with Plan of Care: agrees  Consent Given to Review Plan with: [facility]  Progress: no change  Outcome Summary: Completed social history. Reviewed treatment plans. Patient agreeable.      Problem: Adult Behavioral Health Plan of Care  Goal: Plan of Care Review  Outcome: Ongoing, Progressing  Flowsheets (Taken 11/5/2021 1459)  Consent Given to Review Plan with: [facility]  Progress: no change  Plan of Care Reviewed With: patient  Patient Agreement with Plan of Care: agrees  Outcome Summary: Completed social history. Reviewed treatment plans. Patient agreeable.     Problem: Adult Behavioral Health Plan of Care  Goal: Patient-Specific Goal (Individualization)  Outcome: Ongoing, Progressing  Flowsheets (Taken 11/5/2021 1459)  Patient Personal Strengths:   resilient   resourceful   family/social support   stable living environment   socioeconomic stability   relationship stability   positive attitude   positive vocational history  Patient-Specific Goals (Include Timeframe): Identify 1-2 cognitive distortions  Individualized Care Needs: CBT  Anxieties, Fears or Concerns: Concerned about returning to work  Patient Vulnerabilities:   substance abuse/addiction   poor impulse control   lacks insight into illness   family/relationship conflict     Problem: Adult Behavioral Health Plan of Care  Goal: Optimized Coping Skills in Response to Life Stressors  Intervention: Promote Effective Coping Strategies  Flowsheets (Taken 11/5/2021 1459)  Supportive Measures:   active listening utilized   positive reinforcement provided   self-responsibility promoted   verbalization of feelings encouraged   problem-solving facilitated   counseling provided   self-reflection promoted     Problem: Adult Behavioral Health Plan of Care  Goal: Develops/Participates in Therapeutic [place] to Support Successful Transition  Intervention: Foster Therapeutic  [place]  Flowsheets (Taken 11/5/2021 0978)  Trust Relationship/Rapport:   care explained   reassurance provided   thoughts/feelings acknowledged   choices provided   emotional support provided   empathic listening provided   questions answered   questions encouraged     Problem: Adult Behavioral Health Plan of Care  Goal: Develops/Participates in Therapeutic [place] to Support Successful Transition  Intervention: Mutually Develop Transition Plan  Flowsheets (Taken 11/5/2021 6537)  Outpatient/Agency/Support Group Needs:   12 step program (specify)   residential services   support group(s) (specify)  Discharge Coordination/Progress: [facility]  Transition Support: follow-up care discussed  Transportation Anticipated: family or friend will provide  Anticipated Discharge Disposition: residential substance use unit  Transportation Concerns: car, none  Current Discharge Risk: substance use/abuse  Concerns to be Addressed:   mental health   discharge planning   substance/tobacco abuse/use   coping/stress  Readmission Within the Last 30 Days: no previous admission in last 30 days  Patient/Family Anticipated Services at Transition: rehabilitation services  Patient's Choice of Community Agency(s): [facility]  Patient/Family Anticipates Transition to: inpatient rehabilitation facility  Offered/Gave Vendor List: no       [id number]  D: Patient is a 41-year-old  male currently residing in [facility] where he has been living with his spouse for the past 6 years.  They have been together for 21 years.  The patient has some college education.  He is currently self-employed building insulated out buildings.  The patient identified himself as an addict, reporting his drugs of choice were opioids and marijuana.  He has no history of treatment.  His wife petitioned him with [place]'s law.  The patient planned to [facility] the program at [facility] in [place] directly upon discharge.  However, the nursing staff  report the patient's spouse has been calling frequently and has been requesting he be discharged from the hospital.    A: Patient's affect appeared euthymic.  His mood appeared calm.  He was polite and cooperative with the assessment.  The patient's speech seemed circumstantial at times, and his responses to the therapist were occasionally unrelated to the question being asked.  The patient seemed to have poor insight.  His motivation also seemed poor.  It appeared he was under the impression that he could go home every day while at a residential treatment program.    P: Patient has been placed on a detox regimen.  He will be monitored routinely for his safety.  He will be provided with individual and group therapy.  He will follow-up [facility]  However, his family had informed the nursing staff today that they were on their way to pick him up.  Apparently this was related to the patient having discontinued his Ativan detox.

## 2021-11-05 NOTE — NURSING NOTE
Spoke to Dr. Ruiz via phone. Intake information provided. Instructed to admit the patient. Admit orders received. SP4 routine orders with clonidine and Ativan detox protocol [id number]. Patient and ed provider made aware of plan of care. Safety precautions maintained.

## 2021-11-06 VITALS
HEIGHT: 72 IN | TEMPERATURE: 98.5 F | SYSTOLIC BLOOD PRESSURE: 134 MMHG | HEART RATE: 83 BPM | OXYGEN SATURATION: 98 % | BODY MASS INDEX: 23.7 KG/M2 | RESPIRATION RATE: 18 BRPM | DIASTOLIC BLOOD PRESSURE: 87 MMHG | WEIGHT: 175 LBS

## 2021-11-06 PROCEDURE — 99232 SBSQ HOSP IP/OBS MODERATE 35: CPT | Performed by: PSYCHIATRY & NEUROLOGY

## 2021-11-06 RX ADMIN — FLUOXETINE HYDROCHLORIDE 20 MG: 20 CAPSULE ORAL at 09:05

## 2021-11-06 NOTE — PROGRESS NOTES
"    Detox Recovery Unit Progress Note   Clinician: Jesse Ruiz MD  Admission Date: 11/4/2021  08:25 EDT 11/06/21    Behavioral Health Treatment Plan and Problem List: I have reviewed and approved the Behavioral Health Treatment Plan and Problem list.    Allergies  No Known Allergies    Hospital Day: 2 days      Assessment completed within view of staff    History  CC: withdrawal symptoms    Interval HPI: Patient seen and evaluated by me.  Chart reviewed. Patient is withdrawing from suboxone  Current withdrawal symptoms include: Fatigue ROS otherwise as below.        Interval Review of Systems:   General ROS: negative for - fever.  Positive for withdrawal symptoms mentioned above.  Endocrine ROS: negative for - palpitations  Respiratory ROS: no cough, shortness of breath, or wheezing  Cardiovascular ROS: no chest pain or dyspnea on exertion  Gastrointestinal ROS: no abdominal pain,no black or bloody stools    /87 (BP Location: Right arm, Patient Position: Sitting)   Pulse 83   Temp 98.5 °F (36.9 °C) (Temporal)   Resp 18   Ht 182.9 cm (72\")   Wt 79.4 kg (175 lb)   SpO2 98%   BMI 23.73 kg/m²     Mental Status Exam  Mood: normal  Affect: normal   Thought Processes: linear, logical, and goal directed  Oriented X3:  yes  Thought Content: sensorium intact   Suicidal Thoughts: denies  Homicidal Thoughts: denies        Medical Decision Making:   Labs:     Lab Results (last 24 hours)     ** No results found for the last 24 hours. **            Radiology:     Imaging Results (Last 24 Hours)     ** No results found for the last 24 hours. **            EKG:     ECG/EMG Results (most recent)     Procedure Component Value Units Date/Time    ECG 12 Lead [[id number]] Collected: 11/05/21 0044     Updated: 11/05/21 1240     QT Interval 388 ms      QTC Interval 406 ms     Narrative:      Test Reason : Baseline Cardiac Status  Blood Pressure :   */*   mmHG  Vent. Rate :  66 BPM     Atrial Rate :  66 BPM     P-R Int : 140 " ms          QRS Dur :  94 ms      QT Int : 388 ms       P-R-T Axes :  57  31  61 degrees     QTc Int : 406 ms    Normal sinus rhythm with sinus arrhythmia  Possible Anterior infarct , age undetermined  Abnormal ECG  No previous ECGs available  Confirmed by Bryant Parsons ([id number]) on 11/5/2021 12:37:41 PM    Referred By:            Confirmed By: Bryant Parsons           Medications:  FLUoxetine, 20 mg, Oral, Daily  nicotine, 1 patch, Transdermal, Q24H           All medications reviewed.      Assessment and Plan:  Opioid use disorder, severe, dependence (HCC)  - Continue Clonidine detox         Benzo use  - Patient denies regular use and only took one Xanax and was given Ativan in the ED. Doesn't appear to be in need of Ativan detox at this time       Methamphetamine use disorder, severe (HCC)  - Supportive treatment       Tetrahydrocannabinol (THC) use disorder, severe, dependence (HCC)  - Supportive treatment       Nicotine use disorder  - Encourage cessation       Depressive disorder unspecified  - Continue Prozac.      Continue hospitalization for medical management of drug withdrawal and patient safety and stabilization.  Continue individual and group chemical dependency counseling.   Therapist and treatment team will continue to assist patient in establishing plans for follow-up and rehabilitation that will serve as the next step in care once patient has completed the medical detox.

## 2021-11-06 NOTE — DISCHARGE SUMMARY
Date of Discharge:  11/6/2021    Discharge Diagnosis:Active Problems:    Opioid use disorder, severe, dependence (HCC)    Methamphetamine use disorder, severe (HCC)    Tetrahydrocannabinol (THC) use disorder, severe, dependence (HCC)    Nicotine use disorder    Depression      Presenting Problem/History of Present Illness:  41 y.o. male who was admitted on 11/4/2021 with complaints of opioid use and withdrawals. The patient reports a long history of substance use. First use was at age 20 when he used Xanax and Lorcet recreationally. Over time the use increased and the patient  continued to use despite negative consequences. The patient endorses symptoms of tolerance and withdrawals. Has tried to cut down and stop but has not been successful. Spends too much time and resources in pursuit of substance use. Longest period of sobriety is reported to be 3 months.  Currently using Suboxone 8-2 mg one a day intranasally last use was a week ago, meth half gram daily via smoking last use two weeks ago, marijuana a quarter of ounce daily last use a week ago, oxycodone up to a 100 mg daily intranasally last use three weeks. He states he was at the recovery house and was put in treatment under [place] Law and plans to go back to treatment. He denies regular benzo use, stopped using regularly when he was 21 or 22. He states he used a Xanax one time yesterday, and was also given Ativan one time when he was in the ED.       Hospital Course:  Patient was admitted for detox and stabilization.   Routine labs were checked.  Patient was assigned a masters level therapist and provided with an opportunity to participate in group and individual chemical dependency counseling on the unit.  Patient was started on the appropriate detox protocol.   On hospital day #2 patient requested to leave AGAINST MEDICAL ADVICE. Therapist was notified to help clarify because patient had been petitioned under [place] law by his wife. Patient's wife was  requesting discharge and agreeable to transport patient back to the [facility], her chosen facility. She was agreeable to the AMA discharge.    Consults:   Consults     No orders found from 10/6/2021 to 11/5/2021.          Labs:  Lab Results (all)     None          Imaging:  Imaging Results (All)     None          Vital Signs  Temp:  [97.6 °F (36.4 °C)-98.5 °F (36.9 °C)] 98.5 °F (36.9 °C)  Heart Rate:  [71-92] 83  Resp:  [18] 18  BP: (124-163)/(76-92) 134/87    Discharge Disposition  Left Against Medical Advice    Discharge Medications     Discharge Medications      Patient Not Prescribed Medications Upon Discharge         Discharge Diet: Regular    Activity at Discharge: As tolerated        Time: I spent  15 minutes on this discharge activity which included: face-to-face encounter with the patient, reviewing the data in the system, coordination of the care with the nursing staff as well as consultants, documentation, and entering orders.      Jesse Ruiz MD  11/06/21  14:59 EDT

## 2021-11-06 NOTE — PROGRESS NOTES
Patient requested to speak with this therapist about discharging today. Patient states he is not having any withdrawals and has not needed any meds. Patient states he wants to go back to [facility] today. Patient and patient's wife are requesting discharge, but patient is willing to leave AMA if neccessary. Patient's wife is agreeable to pick him up and take him to [facility]. RN made aware.    1030: Therapist made aware that the patient was petitioned under [place] for treatment by his wife. [place] law requires patient to receive residential treatment, but does not specify the setting for treatment. This is the petitioner's right and responsibility. Patient's wife (the petitioner and responsible party) is requesting discharge and agreeable to transport him back to [facility], her chosen facility. She is agreeable with an AMA discharge.

## 2021-11-06 NOTE — NURSING NOTE
Pt request ama, educated pt on risk and benefits, and educated about potential harm caused by ama.

## 2021-11-06 NOTE — PLAN OF CARE
Goal Outcome Evaluation:  Plan of Care Reviewed With: (P) patient  Patient Agreement with Plan of Care: (P) agrees     Progress: (P) no change  Outcome Summary: (P) pt has elected to be discharged AMA

## 2021-11-08 NOTE — PROGRESS NOTES
Behavioral Health Discharge Summary             Please fax within 24 hours of discharge to [facility] at: [phone]      Member Name: Kvng Adler Member ID: [id number]   Authorization Number: [id number] Phone: [phone]   Member Address: [address] [place], [place] [id number]   Discharge Date: 11/06/2021 Level of Care at Discharge: LEFT AMA   Facility: [facility] Staff Completing Form: Tatiana GRANGER RN U.R.   If the member is being discharged directly to a residential or extended care program, please specify the type below.   __Private Child-Caring Facility (PCC) Residential/Group Home   __Private Child-Caring Facility (PCC) Therapeutic Foster Care   __Residential Treatment Facility (RTF)   __Psychiatric Residential Treatment Facility (PRTF I or II)   __Long-Term Acute Inpatient Hospital Services or Extended Care Unit (ECU)   __Other (please specify):    Brief discharge summary of treatment received (for follow up by the case management team): D/C clinical with list of medications and follow up appts given to patient upon discharge.     BRIEF SUMMARY OF RECOMMENDATIONS FOR ONGOING TREATMENT     Discharged to where: [facility]    Discharge diagnoses: F 11.20  F 15.20   Axis I:    Axis II:    Axis III:    Axis IV:    Axis V:    Does the member understand his/her DX?  Yes          Medication     Dose     Schedule Supply/  Quantity  Given at Discharge RX Provided  Yes/No  If Rx Provided, Quantity RX Prior Auth Required  Yes/No Prior Auth  Completed                                                                                             Does the member understand the reason for taking these medications? Yes                                                           FOLLOW-UP APPOINTMENTS   Please schedule within 7 days of discharge and provide appointment details for all referred services.    PCP/Other Providers Involved in Treatment:    Appointment Type: Left AMA  "went to [facility] Provider Name:   [facility]  Provider Phone: [phone] Appointment Date: 11/06/2021 Appointment Time: Wife to take  to him to [facility]     Assessment   (new to OP services)        Case Management    Is the member already enrolled in case management?  Yes/No  If yes, date the CM was notified:    If no, was the CM referral offered?  Yes/No  Accepted? Yes/No    Is the Release of Information in the chart? Yes/No:      Medication Management (for member discharged with psychiatric medications):      A&D Treatment (for member with substance abuse/   dependence in the past year):      Medical Condition (for member with a medical condition):    Other recommended treatment:    Do you have any concerns about the discharge plan?  No    If yes, explain:    Was the member involved in the discharge planning?  Yes    If no, explain:    Was a copy of the discharge plan provided to the member?  Yes    If no, explain:        Kvng Adler (41 y.o. Male)             Date of Birth Social Security Number Address Home Phone MRN    1980 [id number] [facility] [address] [phone] [id number]    [employer] Marital Status             Unknown        Admission Date Admission Type Admitting Provider Attending Provider Department, Room/Bed    11/4/21 Emergency Jesse Ruiz MD  [facility] ADULT CD, 1030/2S    Discharge Date Discharge Disposition Discharge Destination          11/6/2021 Left Against Medical Advice Home             Attending Provider: (none)   Allergies: No Known Allergies    Isolation: None   Infection: None   Code Status: Prior   Advance Care Planning Activity    Ht: 182.9 cm (72\")   Wt: 79.4 kg (175 lb)    Admission Cmt: None   Principal Problem: None                  "

## 2024-05-28 ENCOUNTER — CONSULT (OUTPATIENT)
Dept: ONCOLOGY | Facility: CLINIC | Age: 44
End: 2024-05-28
Payer: COMMERCIAL

## 2024-05-28 ENCOUNTER — LAB (OUTPATIENT)
Dept: ONCOLOGY | Facility: CLINIC | Age: 44
End: 2024-05-28
Payer: COMMERCIAL

## 2024-05-28 VITALS
RESPIRATION RATE: 18 BRPM | DIASTOLIC BLOOD PRESSURE: 86 MMHG | TEMPERATURE: 97.7 F | WEIGHT: 315 LBS | OXYGEN SATURATION: 96 % | HEIGHT: 72 IN | BODY MASS INDEX: 42.66 KG/M2 | SYSTOLIC BLOOD PRESSURE: 138 MMHG | HEART RATE: 88 BPM

## 2024-05-28 DIAGNOSIS — I74.9 ARTERIAL THROMBOEMBOLISM: Primary | ICD-10-CM

## 2024-05-28 RX ORDER — GABAPENTIN 800 MG/1
800 TABLET ORAL 2 TIMES DAILY
COMMUNITY

## 2024-05-28 RX ORDER — ERGOCALCIFEROL 1.25 MG/1
50000 CAPSULE ORAL WEEKLY
COMMUNITY

## 2024-05-28 RX ORDER — ASPIRIN 81 MG/1
81 TABLET ORAL DAILY
COMMUNITY

## 2024-05-28 RX ORDER — ATORVASTATIN CALCIUM 40 MG/1
40 TABLET, FILM COATED ORAL DAILY
COMMUNITY

## 2024-05-28 NOTE — PROGRESS NOTES
Name:  Kvng Adler  :  1980  Date:  2024     REFERRING PHYSICIAN    PRIMARY CARE PROVIDER  Amalia Garcia FNP    REASON FOR CONSULTATION  1. Arterial thromboembolism      CHIEF COMPLAINT  None.    Dear Ms. Garcia,    HISTORY OF PRESENT ILLNESS:   I saw Mr. Adler in consultation today in our hematology/oncology clinic. As you are aware, he is a pleasant, 43 y.o., white male with a history of drug abuse (though he has been clean since the late /early ) who was in his usual state of health until early Spring 2024 when he developed left lower leg pain. After a few days of this symptom, his left lower leg began to feel increasingly cold and turned white in color. He presented to his local ED ([facility]), where he was diagnosed with limb ischemia and was emergently transferred to the [facility].  vascular surgery performed a left femoral thrombectomy. He was discharged from  on 04/10/2024. He subsequently established routine primary care with you, and you have now referred him to our office for further evaluation of his arterial thrombosis.    INTERIM HISTORY:  Mr. Adler presents to clinic today for initial consultation accompanied by his wife. They confirm the above history. Since the thrombectomy in early April, his leg has been feeling much better. It is still intermittently painful, but these symptoms are nowhere near as intense as they were early last month. He has been on full dose anticoagulation with Eliquis (5 mg PO BID) for going on two months now, and he reports today that he is tolerating this medication well. He denies any known trauma to the left leg, but he did have a COVID infection in 2023. He has no other specific complaints today.    Past Medical History:   Diagnosis Date    Anemia     H/O blood clots     Substance abuse      History reviewed. No pertinent surgical history.    Social History     Socioeconomic History    Marital  status:    Tobacco Use    Smoking status: Every Day     Current packs/day: 1.50     Average packs/day: 1.5 packs/day for 20.0 years (30.0 ttl pk-yrs)     Types: Cigarettes     Start date: 5/28/2004     Passive exposure: Current    Smokeless tobacco: Never   Vaping Use    Vaping status: Never Used   Substance and Sexual Activity    Alcohol use: Not Currently    Drug use: Yes     Types: Benzodiazepines, Other, Oxycodone, Methamphetamines, Marijuana     Comment: Suboxone    Sexual activity: Yes     Partners: Female       Family History   Problem Relation Age of Onset    Cancer Mother     Bipolar disorder Mother     Cancer Father     Cancer Sister        Allergies   Allergen Reactions    Sulfa Antibiotics Other (See Comments)     Patient unsure, states he was a kid when reaction occurred.        Current Outpatient Medications   Medication Sig Dispense Refill    apixaban (ELIQUIS) 5 MG tablet tablet Take 1 tablet by mouth 2 (Two) Times a Day.      aspirin 81 MG EC tablet Take 1 tablet by mouth Daily.      atorvastatin (LIPITOR) 40 MG tablet Take 1 tablet by mouth Daily.      gabapentin (NEURONTIN) 800 MG tablet Take 1 tablet by mouth 2 (Two) Times a Day.      vitamin D (ERGOCALCIFEROL) 1.25 MG (34463 UT) capsule capsule Take 1 capsule by mouth 1 (One) Time Per Week.       No current facility-administered medications for this visit.     REVIEW OF SYSTEMS  CONSTITUTIONAL:  No fever, chills, night sweats or fatigue.  EYES:  No blurry vision, diplopia or other vision changes.  ENT:  No hearing loss, nosebleeds or sore throat.  CARDIOVASCULAR:  No palpitations, arrhythmia, syncopal episodes or edema.  PULMONARY:  No hemoptysis, wheezing, chronic cough or shortness of breath.  GASTROINTESTINAL:  No nausea or vomiting. No constipation or diarrhea. No abdominal pain.  GENITOURINARY:  No hematuria, kidney stones or frequent urination.  MUSCULOSKELETAL:  Chronic low back pain, recently about the same.  INTEGUMENTARY: No  "rashes or pruritus.  ENDOCRINE:  No excessive thirst or hot flashes.  HEMATOLOGIC:  As per the HPI above.  IMMUNOLOGIC:  No allergies or frequent infections.  NEUROLOGIC: No numbness, tingling, seizures or weakness.  PSYCHIATRIC:  No anxiety or depression.    PHYSICAL EXAMINATION  /86   Pulse 88   Temp 97.7 °F (36.5 °C) (Temporal)   Resp 18   Ht 182.9 cm (72\")   Wt (!) 153 kg (338 lb 6.4 oz)   SpO2 96%   BMI 45.90 kg/m²     Pain Score:  Pain Score    24 0933   PainSc: 0-No pain     PHQ-Score Total:  PHQ-9 Total Score:      ECO  GENERAL:  A well-developed, well-nourished, class III obese, white male in no acute distress.  HEENT:  Pupils equally round and reactive to light. Extraocular muscles intact.  CARDIOVASCULAR:  Regular rate and rhythm. No murmurs, gallops or rubs.  LUNGS:  Clear to auscultation bilaterally.  ABDOMEN:  Soft, nontender, nondistended with positive bowel sounds.  EXTREMITIES:  No clubbing, cyanosis or edema bilaterally.  SKIN:  No rashes or petechiae.  NEURO:  Cranial nerves grossly intact. No focal deficits.  PSYCH:  Alert and oriented x3.    LABORATORY  Lab Results   Component Value Date    WBC 13.80 (H) 2024    HGB 13.6 (L) 2024    HCT 41.2 2024    MCV 95 2024     2024    NEUTROABS 7.63 (H) 2024       Lab Results   Component Value Date     2021    K 4.3 2021     (H) 2021    CO2 24.5 2021    BUN 10 2021    CREATININE 0.88 2021    GLUCOSE 127 (H) 2021    CALCIUM 9.3 2021    AST 14 2021    ALT 15 2021    ALKPHOS 88 2021    BILITOT 0.3 2021    PROTEINTOT 6.5 2021    ALBUMIN 4.19 2021     CBC (2024): WBCs: 13.80; HgB: 13.6; Hct: 41.2; platelets: 231    IMAGING  CT angio abdominal aorta bilateral ileofem runoff (2024):  Impression:  1) Complete occlusion of the left common femoral artery with thrombus extending into the proximal " superficial and deep femoral arteries. There is also thrombus at the aortic bifurcation resulting in high grade stenosis of the proximal right common iliac artery and 50% stenosis of the proximal left common iliac artery. Suspect thromboembolic disease of cardiac origin. Recommend echocardiogram for further evaluation.  2) Distal tapering of the bilateral anterior tibial and peroneal arteries, which are patent to the right ankle, however no definite flow is seen in these arteries to the left ankle. The bilateral posterior tibial arteries are patent to the ankle.  3) 2 cm left adrenal nodule with indeterminate imaging characteristics. Recommend nonemergent follow up dedicated adrenal protocol CT.    CT angiogram chest (04/06/2024):  Impression: No significant atherosclerotic changes of the thoracic aorta.    Doppler ankle brachial index single level CAR (05/21/2024):  Impression:  Right: Abnormal study consistent with hemodynamically significant infrainguinal arterial disease resulting in a mild insufficiency at rest.  Left: Normal study: No evidence of hemodynamically significant arterial disease at rest.    PATHOLOGY    IMPRESSION AND PLAN  Mr. Adler is a 43 y.o., white male with:  Arterial thromboembolism: Diagnosed in early April 2024 with acute limb ischemia (of the left lower extremity) caused by complete thrombotic occlusion of the left common femoral artery. He underwent a successful thrombectomy at the [facility] at that time, with improvement/resolution in his left lower leg symptoms. I had a long discussion with the patient and his wife in clinic today regarding our management recommendations from this point. In short, other than an underlying malignancy (and we will obtain some CT scans of the chest, abdomen and pelvis shortly to rule this out), the precipitating factors that cause arterial clotting are very different from the ones that cause venous clotting (DVTs/PEs). The former are  primarily comprised of the risk factors for coronary artery disease (hypertension, obesity, hypercholesterolemia), generally relating to platelet activation. The latter typically involves activating the clotting cascade. Consequently, the antiplatelet drugs (ASA, Plavix, etc.) that are used all the time to treat arterial clotting are not very effective in venous clotting. All of this is, to some extent, an oversimplification; however, thinking of thromboses as a dichotomy has led to the traditional practice of cardiologists/vascular surgeons managing the arterial ones (which much more commonly require invasive procedures such as LHCs and bypass surgeries to adequately treat them) and hematologists mostly dealing with the venous ones (which can largely be managed with medication, i.e. full dose anticoagulation, only). In this patent's case, he has never suffered a venous thrombosis; therefore, the likelihood that a hypercoagulable workup would reveal anything abnormal is very low. Furthermore, even if such a workup did reveal a risk factor for DVTs, it would not change the treatment plan that was already started by [facility] (and with which I agree) for his arterial thrombosis. In summary, he is now on maximal, prophylactic therapy for both arterial (ASA and Eliquis) AND venous (Eliquis) thromboses, ideally for life (or at least until the potential risks outweigh the benefits), due to the severity of his thrombotic event. Given all of this, the patient and his wife were very much in favor of continuing to follow up with just [facility] surgery for the ongoing management of this issue (rather than both theirs and ours; only one is needed to continue prescribing Eliquis). They were also agreeable to an evaluation by cardiology (about which they plan to further discuss at the time of his next appointment with primary care). We would, of course, be happy to see him back in the future in reconsultation, if/when  indicated. The patient and his wife were in agreement with these plans.    It is a pleasure to participate in Mr. Adler's care. Please do not hesitate to call with any questions or concerns that you may have.    A total of 60 minutes were spent coordinating this patient’s care in clinic today; more than 50% of this time was face-to-face with the patient and his wife, reviewing his medical history, discussing the diagnosis and counseling on the treatment recommendations. All questions were answered to their satisfaction.    FOLLOW UP  No scheduled follow up in our clinic. With primary care, [facility] and, if patient agreeable, cardiology.          This document was electronically signed by MARLO Agee MD May 28, 2024 16:27 EDT      CC: FLORY Aguilera

## 2025-01-21 ENCOUNTER — TRANSCRIBE ORDERS (OUTPATIENT)
Dept: ADMINISTRATIVE | Facility: HOSPITAL | Age: 45
End: 2025-01-21
Payer: COMMERCIAL

## 2025-01-21 DIAGNOSIS — R79.89 ABNORMAL LIVER FUNCTION TEST: ICD-10-CM

## 2025-01-21 DIAGNOSIS — E27.9 ADRENAL DISEASE: Primary | ICD-10-CM

## 2025-01-30 ENCOUNTER — HOSPITAL ENCOUNTER (OUTPATIENT)
Dept: ULTRASOUND IMAGING | Facility: HOSPITAL | Age: 45
Discharge: HOME | End: 2025-01-30
Payer: COMMERCIAL

## 2025-01-30 DIAGNOSIS — R79.89 ABNORMAL LIVER FUNCTION TEST: ICD-10-CM

## 2025-01-30 DIAGNOSIS — E27.9 ADRENAL DISEASE: ICD-10-CM

## 2025-01-30 PROCEDURE — 76700 US EXAM ABDOM COMPLETE: CPT | Performed by: RADIOLOGY

## 2025-01-30 PROCEDURE — 76700 US EXAM ABDOM COMPLETE: CPT
